# Patient Record
(demographics unavailable — no encounter records)

---

## 2024-10-09 NOTE — HISTORY OF PRESENT ILLNESS
[FreeTextEntry1] : Name JESSICA GIRALDO  MRN [id number]   Aug  6 1954  Contact Number: [phone] ----------------------------------------------------------------------------------------------------------------------------------------- Date of First Visit: 10/9/24 Referring Provider/PCP: Dr. Richardson -----------------------------------------------------------------------------------------------------------------------------------------  CC: hydrocele  History of Present Illness: JESSICA GIRALDO is a 70 year old male who presents for  evaluation of hydrocele. Saw Dr. Akers for swelling of testicle 2021. US obtained which showed: 1. No intratesticular mass seen. 2. Large right hydrocele and moderate-sized left hydrocele. Tumor markers were negative.  Patient reports over this time hydrocele has slightly increased in size - maybe 25% larger. Not overly bothersome, but makes patient self conscious at times. Not painful.  With regards to urination, denies any issues. Nocturia 1x. No frequency or urgency outside of caffeine intake. Feels like empties bladder fully.  With regards to erections, has not been sexually active. Wakes up with erections. Erections weaker than in the past. Does not always attain erections. Less an issue with maintaining. Had been on Viagra in the past, would like it available if needed.  PSA Trend: 2021: 1.02 24: 1.22  Testicular US 21: FINDINGS:   RIGHT SIDE: Testicle: Normal size and morphology with homogeneous echotexture and normal vascularity (arterial and venous) measuring 4.4 x 3.2 x 3.9 cm, 28 cc. There is a microlith seen in the upper right testicle. No associated mass lesion is seen. Epididymis: Unable to visualize. Hydrocele: Large Varicocele: None.   LEFT SIDE: Testicle: Normal size and morphology with homogeneous echotexture and normal vascularity (arterial and venous) measuring 4.2 x 3.1 x 3.4 cm, 23 cc. Epididymis: Normal size, contour, morphology and vascularity. There is a 1 cm cyst in the head of the left epididymis containing septations. There is also a 6 mm cyst in head of the epididymis. Hydrocele: Moderate-sized Varicocele: None.   IMPRESSION:   1. No intratesticular mass seen. 2. Large right hydrocele and moderate-sized left hydrocele. 3. Right epididymal cysts measuring 1 cm and 6 mm. 4. Right epididymis unable to be visualized.  IPSS1 QOL 1 MAHESH 15  Office US today: Bilateral simple hydrocele, Right- 109 ml, Left- 14 ml. Bilateral epididymal simple head cysts, right epididymal head cyst 8.2 x 5 mm, left epididymal head cyst 6 x 7 mm,4.6 x 5 mm. Right sided epididymis not visualized.  PMH: HTN, retinal detachment, cataracts  PSH: retinal surgery Family History: no  malignancies  Social: single, 2 children, artist, never smoker, no alcohol, no recreational drugs Meds: amlodipine-valsartan Allergies: NKDA ROS: no fevers, chills, flank pain

## 2024-10-09 NOTE — ASSESSMENT
[FreeTextEntry1] : 70 year old male with history of hydrocele here to establish care.  With regards to hydrocele, we discussed the natural history of hydrocele, the options of observation, aspiration and hydrocelectomy.  We discussed risks of hydrocelectomy including but not limited to bleeding, hematoma, infection, infected hematoma, abscess, poor wound healing/wound complications, post-operative swelling, ecchymosis, pain, injury to surrounding structures including testicle, epididymis, vasal obstruction, testicle loss/atrophy, infertility issues, recurrence of hydrocele. He would like to continue observation at this time.  With regards to PSA - wnl - repeat 1 year.  Patient also reports erections weaker than in the past - tried Viagra in past. He is interested in trial of Cialis. Discussed history of HTN - home BP reviewed and wnl and discussed with Dr. Richardson - no contraindication. The most common adverse reactions (>2%) of Cialis include headache, dyspepsia, back pain, myalgia, nasal congestion, flushing and pain in limb. There is also a risk of prolonged erection, particularly those at increased risk for priapism. He was informed that this medication could potentiate the effects of high blood pressure medications. He should stop the medication and contact medical help if any issues or concerns with treatment. He denies prior history of cardiac disease, chest pain, or use of nitrates.  Understands cannot use nitrates if develops chest pain and must inform EMT/medical team.  Plan: - start Cialis prn - conservative management hydrocele for now - fu 6-12 weeks to assess response to Cialis

## 2024-10-09 NOTE — PHYSICAL EXAM
[Chaperone Declined] : Patient declined chaperone [de-identified] : large right hydrocele, small left hydrocele [FreeTextEntry2] : Gwen MADRIGAL

## 2024-10-09 NOTE — PHYSICAL EXAM
[Chaperone Declined] : Patient declined chaperone [de-identified] : large right hydrocele, small left hydrocele [FreeTextEntry2] : Gwen MADRIGAL

## 2024-10-09 NOTE — HISTORY OF PRESENT ILLNESS
[FreeTextEntry1] : Name JESSICA GIRALDO  MRN [id number]   Aug  6 1954  Contact Number: [phone] ----------------------------------------------------------------------------------------------------------------------------------------- Date of First Visit: 10/9/24 Referring Provider/PCP: Dr. Richardson -----------------------------------------------------------------------------------------------------------------------------------------  CC: hydrocele  History of Present Illness: JESSICA GIARLDO is a 70 year old male who presents for  evaluation of hydrocele. Saw Dr. Aekrs for swelling of testicle 2021. US obtained which showed: 1. No intratesticular mass seen. 2. Large right hydrocele and moderate-sized left hydrocele. Tumor markers were negative.  Patient reports over this time hydrocele has slightly increased in size - maybe 25% larger. Not overly bothersome, but makes patient self conscious at times. Not painful.  With regards to urination, denies any issues. Nocturia 1x. No frequency or urgency outside of caffeine intake. Feels like empties bladder fully.  With regards to erections, has not been sexually active. Wakes up with erections. Erections weaker than in the past. Does not always attain erections. Less an issue with maintaining. Had been on Viagra in the past, would like it available if needed.  PSA Trend: 2021: 1.02 24: 1.22  Testicular US 21: FINDINGS:   RIGHT SIDE: Testicle: Normal size and morphology with homogeneous echotexture and normal vascularity (arterial and venous) measuring 4.4 x 3.2 x 3.9 cm, 28 cc. There is a microlith seen in the upper right testicle. No associated mass lesion is seen. Epididymis: Unable to visualize. Hydrocele: Large Varicocele: None.   LEFT SIDE: Testicle: Normal size and morphology with homogeneous echotexture and normal vascularity (arterial and venous) measuring 4.2 x 3.1 x 3.4 cm, 23 cc. Epididymis: Normal size, contour, morphology and vascularity. There is a 1 cm cyst in the head of the left epididymis containing septations. There is also a 6 mm cyst in head of the epididymis. Hydrocele: Moderate-sized Varicocele: None.   IMPRESSION:   1. No intratesticular mass seen. 2. Large right hydrocele and moderate-sized left hydrocele. 3. Right epididymal cysts measuring 1 cm and 6 mm. 4. Right epididymis unable to be visualized.  IPSS1 QOL 1 MAHESH 15  Office US today: Bilateral simple hydrocele, Right- 109 ml, Left- 14 ml. Bilateral epididymal simple head cysts, right epididymal head cyst 8.2 x 5 mm, left epididymal head cyst 6 x 7 mm,4.6 x 5 mm. Right sided epididymis not visualized.  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 I had a detailed discussion with the patient and/or guardian regarding the historical points, exam findings, and any diagnostic results supporting the discharge/admit diagnosis.

## 2024-10-10 NOTE — HISTORY OF PRESENT ILLNESS
[FreeTextEntry1] : HTN c/b HTN urgency   Low MCHC, +folate deficiency  Low normal B12 [de-identified] : HTN c/b HTN urgency Started on amlodipine-valsartan  mg BP now 162/89 so urgency appears to have resolved However, after last visit he immediately bought an at-home BP monitor and at home measurements are in normal ranges: 117/78 / 126/82 / 125/80 (brought pictures) / says he gets palpably anxious at the doctor's office   HLD () ASCVD risk 28.2% in next 10 years   Discussed low MCHC, folate deficiency, low normal B12 He'd like to work with a dietitian for this and HLD  Low physical activity Decreased age-related muscle strength/mass Would like PT referral   Grey-black raised lesion on back Noticed by his girlfriend Today he shared mother had h/o melanoma  Hydrocele Erectile dysfunction Saw Dr. Jerry today as well and was started on Cialis PRN

## 2024-10-10 NOTE — PHYSICAL EXAM
[No Acute Distress] : no acute distress [Well Nourished] : well nourished [Well Developed] : well developed [Normal Voice/Communication] : normal voice/communication [EOMI] : extraocular movements intact [Normal Outer Ear/Nose] : the outer ears and nose were normal in appearance [Supple] : supple [No Respiratory Distress] : no respiratory distress  [Normal Rate] : normal rate  [Non-distended] : non-distended [Normal Gait] : normal gait [Speech Grossly Normal] : speech grossly normal [Memory Grossly Normal] : memory grossly normal [Normal Affect] : the affect was normal [Alert and Oriented x3] : oriented to person, place, and time [Normal Mood] : the mood was normal [Normal Insight/Judgement] : insight and judgment were intact [de-identified] : General age-related decreased muscle mass/strength  [de-identified] : There is a ~1x0.8cm raised grey-black lesion on his back with a rough, sandpaper like texture, smooth margins  [de-identified] : Pleasant, good eye contact, slightly anxious

## 2024-10-10 NOTE — PHYSICAL EXAM
[No Acute Distress] : no acute distress [Well Nourished] : well nourished [Well Developed] : well developed [Normal Voice/Communication] : normal voice/communication [EOMI] : extraocular movements intact [Normal Outer Ear/Nose] : the outer ears and nose were normal in appearance [Supple] : supple [No Respiratory Distress] : no respiratory distress  [Normal Rate] : normal rate  [Non-distended] : non-distended [Normal Gait] : normal gait [Speech Grossly Normal] : speech grossly normal [Memory Grossly Normal] : memory grossly normal [Normal Affect] : the affect was normal [Alert and Oriented x3] : oriented to person, place, and time [Normal Mood] : the mood was normal [Normal Insight/Judgement] : insight and judgment were intact [de-identified] : General age-related decreased muscle mass/strength  [de-identified] : There is a ~1x0.8cm raised grey-black lesion on his back with a rough, sandpaper like texture, smooth margins  [de-identified] : Pleasant, good eye contact, slightly anxious

## 2024-10-10 NOTE — HISTORY OF PRESENT ILLNESS
[FreeTextEntry1] : HTN c/b HTN urgency   Low MCHC, +folate deficiency  Low normal B12 [de-identified] : HTN c/b HTN urgency Started on amlodipine-valsartan  mg BP now 162/89 so urgency appears to have resolved However, after last visit he immediately bought an at-home BP monitor and at home measurements are in normal ranges: 117/78 / 126/82 / 125/80 (brought pictures) / says he gets palpably anxious at the doctor's office   HLD () ASCVD risk 28.2% in next 10 years   Discussed low MCHC, folate deficiency, low normal B12 He'd like to work with a dietitian for this and HLD  Low physical activity Decreased age-related muscle strength/mass Would like PT referral   Grey-black raised lesion on back Noticed by his girlfriend Today he shared mother had h/o melanoma  Hydrocele Erectile dysfunction Saw Dr. Jerry today as well and was started on Cialis PRN

## 2024-10-10 NOTE — ASSESSMENT
[FreeTextEntry1] : #HTN HTN urgency now resolved Started on amlodipine-valsartan  mg /89 here but at-home measurements are in normal ranges: 117/78 / 126/82 / 125/80 - likely has a component of anxiety/white coat HTN here - Check BMP - IF normal, will c/w same dose - Increase walks, will attend PT as well for strength training - Dietitian referral   #HLD () ASCVD risk 28.2% in next 10 years Strongly explained statin is indicated - He'd like a trial of dietary modification for a few months >>> dietitian referral   #Folate deficiency #Low normal B12 - Dietitian referral   #Low physical activity #Decreased age-related muscle strength/mass - PT referral   #Grey-black raised lesion on back r/o malignancy  - Derm referral   #Hydrocele #Erectile dysfunction - Saw Dr. Jerry today as well and was started on Cialis PRN, monitor hydrocele

## 2024-10-23 NOTE — HISTORY OF PRESENT ILLNESS
[FreeTextEntry1] : 70M w/ HTN, HLD. Here as NP for CRC screening.    -Referred by PCP  for CRC screening. Never had a colonoscopy or other testing before and denies any GI Sx. - BM: qday, formed, - straining, - blood - ROS (if blank, symptom not present): [ ] Wt loss                             [ ] Fevers [ ] Constipation                     [ ] Diarrhea [ ] Bloody stools / melena     [ ] Hematemesis [ ] Heartburn                         [ ] Dysphagia [ ] N/V                                   [ ] Abd pain - FamHx of GI Ca: x Yes [ ] No ---father w/ panc cancer 70s ---PGF w/ rectal cancer in 60 ---sister and 2 of her adult children w/ UC - Tolerates > 4 METs: x Yes [ ] No - Substance use: [ ] tobacco, former ETOH, [ ] rec drugs

## 2024-10-23 NOTE — ASSESSMENT
[FreeTextEntry1] : 70M w/ HTN, HLD. Here as NP for CRC screening.    # CRC screening, index, average risk Pt inquired about eligibility for stool testing. Given no alarm signs and no factors for elevated risk pt eligible for stool based screening.  -Counseled extensively on COL & cologuard including risks and benefits, including sensitivity for both and the risk for missing a Dx of Ca for both, and pt elected for Cologuard -Order placed -Counseled that he has low risk for having UC given no Sx, and counseled on risk for UC and COL cancer   FU PRN

## 2024-10-23 NOTE — PHYSICAL EXAM
[Alert] : alert [Sclera] : the sclera and conjunctiva were normal [Hearing Threshold Finger Rub Not [place]] : hearing was normal [No Respiratory Distress] : no respiratory distress [No Acc Muscle Use] : no accessory muscle use [Abdomen Tenderness] : non-tender [No Masses] : no abdominal mass palpated [Abdomen Soft] : soft

## 2024-10-24 NOTE — PHYSICAL EXAM
[Normal Voice/Communication] : normal voice/communication [Alert and Oriented x3] : oriented to person, place, and time [Normal Insight/Judgement] : insight and judgment were intact

## 2024-10-24 NOTE — ASSESSMENT
[FreeTextEntry1] : # Seborrheic keratosis, trunk/extremities  - chronic, stable #Cherry angioma -I discussed the chronic nature and course of the condition -reviewed benign nature  #Actinic keratosis - L upper forehead -new problem, uncertain prognosis -Cryotherapy was performed per procedure note. -asked pt to return for follow up if the spots do not resolve with cryotherapy The risks/benefits/alternatives of cryo-destruction was explained to the patient which, include but are not limited to redness, swelling, pain, blistering, scar, discoloration of skin, and recurrence. The patient expressed understanding of these risks and agreed to the procedure. 1 lesions treated with 2 cycles of LN2. The procedure was well tolerated, without complication. Wound care was reviewed.  RTC PRN
Detail Level: Detailed
Biopsy Method: Dermablade
Curettage Text: The wound bed was treated with curettage after the biopsy was performed.
Notification Instructions: Patient will be notified of biopsy results. However, patient instructed to call the office if not contacted within 2 weeks.
Destruction After The Procedure: No
Silver Nitrate Text: The wound bed was treated with silver nitrate after the biopsy was performed.
Size Of Lesion In Cm: 0.8
Anesthesia Type: 2% lidocaine with epinephrine and a 1:10 solution of 8.4% sodium bicarbonate
Additional Anesthesia Volume In Cc (Will Not Render If 0): 0
Post-Care Instructions: I reviewed with the patient in detail post-care instructions. Patient is to keep the biopsy site dry overnight, and then apply bacitracin twice daily until healed. Patient may apply hydrogen peroxide soaks to remove any crusting.
Wound Care: Aquaphor
Electrodesiccation And Curettage Text: The wound bed was treated with electrodesiccation and curettage after the biopsy was performed.
Billing Type: Third-Party Bill
Depth Of Biopsy: dermis
Biopsy Type: H and E
Electrodesiccation Text: The wound bed was treated with electrodesiccation after the biopsy was performed.
Type Of Destruction Used: Curettage
Anesthesia Volume In Cc (Will Not Render If 0): 0.5
Render Post-Care Instructions In Note?: yes
Lab Facility: [id number]
Dressing: bandage
Cryotherapy Text: The wound bed was treated with cryotherapy after the biopsy was performed.
Hemostasis: Drysol
Consent: Written consent was obtained and risks were reviewed including but not limited to scarring, infection, bleeding, scabbing, incomplete removal, nerve damage and allergy to anesthesia.
Lab: [id number]

## 2024-10-24 NOTE — HISTORY OF PRESENT ILLNESS
[Home] : at home, [unfilled] , at the time of the visit. [Medical Office: ([facility])___] : at the medical office located in  [Verbal consent obtained from patient] : the patient, [unfilled] [FreeTextEntry1] : Concerned about one BP measurement of 91/61 [de-identified] : Mr. Nazario presented to me with HTN c/b HTN urgency Started on amlodipine-valsartan  mg after which urgency resolved. He has an element of white coat HTN with BP in normal range at home now.  Today he's following up as he noticed one reading at home was as low as 91/61 measured around 3 pm (he takes the medication in AM when he gets up). This was asymptomatic. He does allude to some occasional mild lightheadedness here and there that may have been associated with him being hungry but this was when he was outside his home so he does not know his BP measurement at that time. His other measurements are in the range 104-118/77-80.

## 2024-10-24 NOTE — PHYSICAL EXAM
[Alert] : alert [Oriented x 3] : ~L oriented x 3 [Declined] : declined [FreeTextEntry3] : Focused exam: -stuck on brown papules, plaques on the face, back, chest, arms -erythematous papule lower abdomen -pink gritty papule L upper forehead

## 2024-10-24 NOTE — REVIEW OF SYSTEMS
[Chest Pain] : no chest pain [Palpitations] : no palpitations [Claudication] : no  leg claudication [Lower Ext Edema] : no lower extremity edema [Orthopena] : no orthopnea [Paroxysmal Nocturnal Dyspnea] : no paroxysmal nocturnal dyspnea [Wheezing] : no wheezing [Cough] : no cough [Dyspnea on Exertion] : not dyspnea on exertion [Headache] : no headache [Fainting] : no fainting [Confusion] : no confusion [Unsteady Walk] : no ataxia [Memory Loss] : no memory loss [de-identified] : Occasional lightheadedness - see HPI

## 2024-10-24 NOTE — HISTORY OF PRESENT ILLNESS
[FreeTextEntry1] : Lesion on back [de-identified] : Óscar Nazario 71y/o M presents for lesion on the back   noticed by a friend asx also with a red spot on the abdomen   Personal history of skin cancer: No Family history of skin cancer: Yes (mother at older age) History of blistering sunburns: No History of tanning bed use: No Uses sunscreen regularly: No

## 2024-10-24 NOTE — ASSESSMENT
[FreeTextEntry1] : Mr. Nazario presented to me with HTN c/b HTN urgency. I started him on amlodipine-valsartan  mg after which urgency resolved. He has an element of white coat HTN with BP in normal range at home now. Today he's following up with one home BP measurement as low as 91/61, asymptomatic. - Explained that generally it's BP <90/<60 that's considered hypotension - Given this was an isolated, asymptomatic reading, will not adjust medication dosage at this point - However, advised to c/w with his excellent and diligent monitoring and let me know the trend. - Advised to see if he can measure his BP any time he feels lightheaded - If indicated, we will adjust the dose to amlodipine-valsartan 5-160mg. He will get back to me.

## 2024-10-24 NOTE — HISTORY OF PRESENT ILLNESS
[Home] : at home, [unfilled] , at the time of the visit. [Medical Office: ([facility])___] : at the medical office located in  [Verbal consent obtained from patient] : the patient, [unfilled] [FreeTextEntry1] : Concerned about one BP measurement of 91/61 [de-identified] : Mr. Nazario presented to me with HTN c/b HTN urgency Started on amlodipine-valsartan  mg after which urgency resolved. He has an element of white coat HTN with BP in normal range at home now.  Today he's following up as he noticed one reading at home was as low as 91/61 measured around 3 pm (he takes the medication in AM when he gets up). This was asymptomatic. He does allude to some occasional mild lightheadedness here and there that may have been associated with him being hungry but this was when he was outside his home so he does not know his BP measurement at that time. His other measurements are in the range 104-118/77-80.

## 2024-12-09 NOTE — ASSESSMENT
[FreeTextEntry1] : #HTN - Decrease amlodipine-valsartan to 5-160 mg - F/u in 2-3 months with home readings  #HLD () ASCVD risk 28.2% in next 10 years Strongly explained statin is indicated - He'd like a trial of dietary modification for a few months  #Folate deficiency #Low normal B12 - Working on diet  #Low physical activity #Decreased age-related muscle strength/mass - Encouraged resistance exercises and optimal lean protein intake  #Multiple skin lesions - actinic keratosis, seborrheic keratosis, cherry angioma - Saw Derm, f/u PRN  #Erectile dysfunction #Hydrocele - Saw Dr. Jerry and was started on Cialis PRN - For hydrocele, he's like to consider hydrocelectomy and has an appt with Dr. Jerry today to discuss with her  #HCM - Colorectal screening: Went to GI and advised to get Cologuard - COVID, Flu, RSV, PCV - UTD (received from a pharmacy)

## 2024-12-09 NOTE — HISTORY OF PRESENT ILLNESS
[FreeTextEntry1] : HTN [de-identified] : #HTN /87 here (has an element of white coat HTN) Home readings  / 58-86 On amlodipine-valsartan  mg  #HLD () ASCVD risk 28.2% in next 10 years Strongly explained statin is indicated He'd like a trial of dietary modification for a few months  #Folate deficiency #Low normal B12 Working on diet  #Low physical activity #Decreased age-related muscle strength/mass Encouraged resistance exercises  #Multiple skin lesions - actinic keratosis, seborrheic keratosis, cherry angioma Saw Derm  #Erectile dysfunction #Hydrocele Saw Dr. Jerry and was started on Cialis PRN For hydrocele, he's like to consider hydrocelectomy and has an appt with Dr. Jerry today to discuss with her  #HCM - Colorectal screening: Went to GI and advised to get Cologuard - COVID, Flu, RSV, PCV - UTD (received from a pharmacy)

## 2024-12-09 NOTE — LETTER BODY
[Dear  ___] : Dear  [unfilled], [Courtesy Letter:] : I had the pleasure of seeing your patient, [unfilled], in my office today. [Please see my note below.] : Please see my note below. [Consult Closing:] : Thank you very much for allowing me to participate in the care of this patient.  If you have any questions, please do not hesitate to contact me. [Sincerely,] : Sincerely, [FreeTextEntry3] : Irma Jerry MD Director of Robotic Education [facility]   [email or web address] [phone]

## 2024-12-09 NOTE — ASSESSMENT
[FreeTextEntry1] : 70 year old male with history of hydrocele and ED.  With regards to hydrocele, we discussed the natural history of hydrocele, the options of observation, aspiration and hydrocelectomy.  We discussed risks of hydrocelectomy including but not limited to bleeding, hematoma, infection, infected hematoma, abscess, poor wound healing/wound complications, post-operative swelling, ecchymosis, pain, injury to surrounding structures including testicle, epididymis, vasal obstruction, testicle loss/atrophy, infertility issues, recurrence of hydrocele. He is also considering drainage. We discussed high risk recurrence with drainage alone and associated procedural risks. He would like to continue observation at this time, but will notify me if changes his mind.  With regards to PSA - wnl - repeat 1 year - 9/2025  Patient also reports erections weaker than in the past - tried Viagra in past. Last visit started Cialis prn - not enough use over this time to determine impact but no side effects. He would like to continue for now.  Plan: - continue Cialis prn - conservative management hydrocele for now - will notify if any issues with meds - if all stable fu 9/2025 with PSA prior

## 2024-12-09 NOTE — HISTORY OF PRESENT ILLNESS
[FreeTextEntry1] : Name JESSICA GIRALDO  MRN [id number]   Aug  6 1954  Contact Number: [phone] ----------------------------------------------------------------------------------------------------------------------------------------- Date of First Visit: 10/9/24 Referring Provider/PCP: Dr. Richardson -----------------------------------------------------------------------------------------------------------------------------------------  CC: hydrocele  History of Present Illness: JESSICA GIRALDO is a 70 year old male who presents for  evaluation of hydrocele. Saw Dr. Akers for swelling of testicle 2021. US obtained which showed: 1. No intratesticular mass seen. 2. Large right hydrocele and moderate-sized left hydrocele. Tumor markers were negative.  Patient reports over this time hydrocele has slightly increased in size - maybe 25% larger. Not overly bothersome, but makes patient self conscious at times. Not painful.  With regards to urination, denies any issues. Nocturia 1x. No frequency or urgency outside of caffeine intake. Feels like empties bladder fully.  With regards to erections, has not been sexually active. Wakes up with erections. Erections weaker than in the past. Does not always attain erections. Less an issue with maintaining. Had been on Viagra in the past, would like it available if needed.  PSA Trend: 2021: 1.02 24: 1.22  Testicular US 21: FINDINGS:   RIGHT SIDE: Testicle: Normal size and morphology with homogeneous echotexture and normal vascularity (arterial and venous) measuring 4.4 x 3.2 x 3.9 cm, 28 cc. There is a microlith seen in the upper right testicle. No associated mass lesion is seen. Epididymis: Unable to visualize. Hydrocele: Large Varicocele: None.   LEFT SIDE: Testicle: Normal size and morphology with homogeneous echotexture and normal vascularity (arterial and venous) measuring 4.2 x 3.1 x 3.4 cm, 23 cc. Epididymis: Normal size, contour, morphology and vascularity. There is a 1 cm cyst in the head of the left epididymis containing septations. There is also a 6 mm cyst in head of the epididymis. Hydrocele: Moderate-sized Varicocele: None.   IMPRESSION:   1. No intratesticular mass seen. 2. Large right hydrocele and moderate-sized left hydrocele. 3. Right epididymal cysts measuring 1 cm and 6 mm. 4. Right epididymis unable to be visualized.  IPSS1 QOL 1 MAHESH 15  Office US today: Bilateral simple hydrocele, Right- 109 ml, Left- 14 ml. Bilateral epididymal simple head cysts, right epididymal head cyst 8.2 x 5 mm, left epididymal head cyst 6 x 7 mm,4.6 x 5 mm. Right sided epididymis not visualized. ---------------------------------------------------------------------------------------------------------------------------------------- Interval History (2024):  Last visit started Cialis prn. Patient reports only took it once and took it once was already being active, so not much impact. No associate side effects with that time. Does not want to make any changes at this time. No issues with regards to hydrocele ---------------------------------------------------------------------------------------------------------------------------------------- PMH: HTN, retinal detachment, cataracts  PSH: retinal surgery Family History: no  malignancies  Social: single, 2 children, artist, never smoker, no alcohol, no recreational drugs Meds: amlodipine-valsartan Allergies: NKDA ROS: no fevers, chills, flank pain

## 2024-12-09 NOTE — PHYSICAL EXAM
[No Acute Distress] : no acute distress [Well Nourished] : well nourished [Well Developed] : well developed [Well-Appearing] : well-appearing [Normal Voice/Communication] : normal voice/communication [Normal Sclera/Conjunctiva] : normal sclera/conjunctiva [EOMI] : extraocular movements intact [Normal Outer Ear/Nose] : the outer ears and nose were normal in appearance [Supple] : supple [No Respiratory Distress] : no respiratory distress  [Normal Rate] : normal rate  [No Edema] : there was no peripheral edema [Normal Gait] : normal gait [Speech Grossly Normal] : speech grossly normal [Memory Grossly Normal] : memory grossly normal [Normal Affect] : the affect was normal [Alert and Oriented x3] : oriented to person, place, and time [Normal Mood] : the mood was normal [Normal Insight/Judgement] : insight and judgment were intact

## 2025-01-16 NOTE — HISTORY OF PRESENT ILLNESS
[de-identified] : Woke up 4:30a to urinate dizziness /140 Woke up at 7a 124/84 before med, then took med 118/78, 119/80 Overall 104/72   Sitting 167/99 78 Standing 163/92 78
